# Patient Record
Sex: MALE | Race: WHITE
[De-identification: names, ages, dates, MRNs, and addresses within clinical notes are randomized per-mention and may not be internally consistent; named-entity substitution may affect disease eponyms.]

---

## 2017-04-27 ENCOUNTER — HOSPITAL ENCOUNTER (OUTPATIENT)
Age: 47
Discharge: HOME | End: 2017-04-27
Payer: MEDICAID

## 2017-04-27 DIAGNOSIS — J45.909: Primary | ICD-10-CM

## 2017-07-10 ENCOUNTER — HOSPITAL ENCOUNTER (EMERGENCY)
Dept: HOSPITAL 76 - ED | Age: 47
Discharge: HOME | End: 2017-07-10
Payer: MEDICAID

## 2017-07-10 VITALS — SYSTOLIC BLOOD PRESSURE: 125 MMHG | DIASTOLIC BLOOD PRESSURE: 87 MMHG

## 2017-07-10 DIAGNOSIS — R03.0: ICD-10-CM

## 2017-07-10 DIAGNOSIS — K08.89: Primary | ICD-10-CM

## 2017-07-10 PROCEDURE — 99283 EMERGENCY DEPT VISIT LOW MDM: CPT

## 2017-07-10 NOTE — ED PHYSICIAN DOCUMENTATION
PD HPI HEENT





- Stated complaint


Stated Complaint: TOOTH PX





- Chief complaint


Chief Complaint: Heent





- History obtained from


History obtained from: Patient, Family





- History of Present Illness


Timing - onset: How many days ago (4)


Timing - duration: Days (4)


Timing - details: Gradual onset


Pain level max: 7


Pain level now: 6


Location: Tooth (L lower molar)


Improves: Nothing


Worsens: Temperatures, Other (chewing).  No: Swalllowing, Noise, Position


Associated symptoms: No: Fever, Congestion, Rhinorrhea, Trismus, Unable to 

swallow, Swollen nodes, Facial swelling, Headache, Cough


Similar symptoms before: Has not had sx before


Recently seen: Not recently seen





Review of Systems


Ten Systems: 10 systems reviewed and negative


Constitutional: denies: Fever, Chills


Ears: denies: Ear pain


Nose: denies: Rhinorrhea / runny nose, Congestion


Throat: denies: Sore throat


Cardiac: denies: Chest pain / pressure, Calf pain


Respiratory: denies: Cough


GI: denies: Nausea, Vomiting


Skin: denies: Rash


Musculoskeletal: denies: Neck pain, Back pain


Neurologic: denies: Headache





PD PAST MEDICAL HISTORY





- Past Medical History


Cardiovascular: None


Respiratory: Asthma


Neuro: None


Endocrine/Autoimmune: None


GI: None


: None


HEENT: None


Psych: None


Musculoskeletal: None


Derm: None





- Past Surgical History


Past Surgical History: No





- Present Medications


Home Medications: 


 Ambulatory Orders











 Medication  Instructions  Recorded  Confirmed


 


Albuterol [Ventolin Hfa] 2 puffs IH Q4HR PRN 11/28/15 07/10/17


 


Hydrocodone/Acetaminophen 1 - 2 each PO Q6H PRN #14 tablet 07/10/17 





[Hydrocodon-Acetaminophen 5-325]   


 


Penicillin V Potassium 500 mg PO Q6HR #40 tablet 07/10/17 














- Allergies


Allergies/Adverse Reactions: 


 Allergies











Allergy/AdvReac Type Severity Reaction Status Date / Time


 


No Known Drug Allergies Allergy   Verified 07/10/17 19:24














- Social History


Does the pt smoke?: No


Smoking Status: Never smoker


Does the pt drink ETOH?: Yes


Does the pt have substance abuse?: No





- Immunizations


Immunizations are current?: Yes





PD ED PE NORMAL





- Vitals


Vital signs reviewed: Yes





- General


General: Alert and oriented X 3, No acute distress





- HEENT


HEENT: Ears normal, Moist mucous membranes





- Neck


Neck: Supple, no meningeal sign





- Cardiac


Cardiac: RRR, Strong equal pulses





- Respiratory


Respiratory: No respiratory distress, Clear bilaterally





- Derm


Derm: Warm and dry





- Neuro


Neuro: Alert and oriented X 3





- Psych


Psych: Normal mood, Normal affect





PD ED PE EXPANDED





- HEENT


HEENT Visual: 


  __________________________














  __________________________





 1 - tenderness (no swelling, no abscess.)








Results





- Vitals


Vitals: 


 Vital Signs - 24 hr











  07/10/17





  19:22


 


Temperature 36.4 C L


 


Heart Rate 64


 


Respiratory 18





Rate 


 


Blood Pressure 125/87 H


 


O2 Saturation 97








 Oxygen











O2 Source                      Room air

















PD MEDICAL DECISION MAKING





- ED course


Complexity details: considered differential, d/w patient


ED course: 





Patient is a 47-year-old male who presents to the emergency department with 

dental pain.  Tenderness to palpation over the tooth.  He is well-appearing, 

nontoxic.  Afebrile.  No drainable abscess.  No facial cellulitis. Patient 

counseled regarding signs and symptoms for which I believe and urgent re-

evaluation would be necessary. Patient with good understanding of and agreement 

to plan and is comfortable going home at this time





This document was made in part using voice recognition software. While efforts 

are made to proofread this document, sound alike and grammatical errors may 

occur.





Departure





- Departure


Disposition: 01 Home, Self Care


Clinical Impression: 


 Pain, dental


Condition: Good


Instructions:  ED Tooth Pain


Follow-Up: 


your,dentist tomorrow [Other]


Prescriptions: 


Penicillin V Potassium 500 mg PO Q6HR #40 tablet


Hydrocodone/Acetaminophen [Hydrocodon-Acetaminophen 5-325] 1 - 2 each PO Q6H 

PRN #14 tablet


 PRN Reason: pain


Comments: 


Take all antibiotics until gone.  Return if you worsen.  You need to follow-up 

with a dentist tomorrow for evaluation of your tooth.


Do not drink alcohol or drive while on narcotic pain medicine. 


Note that many narcotic pain relievers also contain tylenol/acetaminophen. 

Please ensure that your total dose of acetaminophen from all sources does not 

exceed 3 grams (3000mg) per day. 


You may constipated on this medication, take a stool softener such as "Colace" 

twice a day while you are on it. Also recommend a over-the-counter laxative 

such as senna or MiraLAX any day that you do not have a bowel movement. 


If you received narcotic pain medication in the emergency department, do not 

drive or operate machinery for the next 24 hours.








Your blood pressure was elevated today on check in to the emergency department. 

This does not mean that you have hypertension, it is a common phenomenon to 

check into the emergency department and have elevated blood pressure. I 

recommend that you see your primary care physician within the week to have it 

rechecked when you're feeling better.





Discharge Date/Time: 07/10/17 19:56

## 2018-11-15 ENCOUNTER — HOSPITAL ENCOUNTER (OUTPATIENT)
Age: 48
End: 2018-11-15
Payer: COMMERCIAL

## 2018-11-15 DIAGNOSIS — M51.17: ICD-10-CM

## 2018-11-15 DIAGNOSIS — M48.061: ICD-10-CM

## 2018-11-15 DIAGNOSIS — M48.07: ICD-10-CM

## 2018-11-15 DIAGNOSIS — M51.16: Primary | ICD-10-CM

## 2018-11-15 DIAGNOSIS — M47.26: ICD-10-CM

## 2018-11-15 DIAGNOSIS — M47.27: ICD-10-CM

## 2018-11-15 PROCEDURE — 72148 MRI LUMBAR SPINE W/O DYE: CPT

## 2018-11-15 NOTE — DI.MRI.S_ITS
PROCEDURE:  MR LUMBAR SPINE WO CON  
   
INDICATIONS:  Lumbago with sciatica, left side  
   
TECHNIQUE:    
Noncontrast sagittal T1 spin echo and T2 fast echo, sagittal STIR, axial T1 and T2 fast   
spin echo through the lumbar spine.  In cases with scoliosis, additional coronal T2 fast   
spin echo may be performed.    
   
COMPARISON:  None.  
   
FINDINGS:    
Image quality:  Excellent.    
   
Alignment and Curvature:  There is normal bony alignment.    
   
Bone Marrow: Mild reactive endplate change is noted adjacent to the L1-L2 and L2-L3   
discs. No acute vertebral body compression fractures.    
   
Spinal Cord:  Conus medullaris terminates at the T12-L1 disc level.  Visualized cord   
demonstrates normal signal and size.    
   
Paraspinous Soft Tissues:  No paravertebral masses.    
   
L1-L2: Loss of disc signal. Mild, diffuse disc bulge. No central stenosis. No neural   
foraminal narrowing. No neural impingement.  
   
L2-L3: Loss of disc signal. No central stenosis. No neural foraminal narrowing. No neural   
impingement.  
   
L3-L4:  Loss of disc signal. No central stenosis. No neural foraminal narrowing. No   
neural impingement.  
   
L4-L5:  Loss of disc signal. Mild bilateral facet hypertrophy. No central stenosis. No   
neural foraminal narrowing. No neural impingement.  
   
L5-S1: Loss of disc signal and height. Mild, diffuse disc bulge. Mild bilateral facet   
hypertrophy. Moderate-sized left central disc extrusion. Extruded disc material impinges   
upon the traversing left S1 nerve root. Mild narrowing of the central canal. No neural   
foraminal narrowing.  
   
IMPRESSION:  
   
1. Multilevel degenerative disc disease.  
   
2. Multilevel facet arthropathy.  
   
3. Moderate-sized left central L5-S1 disc extrusion which impinges upon the traversing   
left S1 nerve root. Please correlate with clinical data.  
   
4. Mild L5-S1 central canal narrowing.  
   
5. No neural foraminal narrowing.  
   
   
   
Dictated by: Lucero Valdez MD, PhD on 11/15/2018 at 9:44       
Approved by: Lucero Valdez MD, PhD on 11/15/2018 at 9:47

## 2020-06-15 ENCOUNTER — HOSPITAL ENCOUNTER (EMERGENCY)
Age: 50
LOS: 1 days | Discharge: HOME | End: 2020-06-16
Payer: COMMERCIAL

## 2020-06-15 VITALS
RESPIRATION RATE: 18 BRPM | TEMPERATURE: 98.24 F | DIASTOLIC BLOOD PRESSURE: 64 MMHG | SYSTOLIC BLOOD PRESSURE: 129 MMHG | OXYGEN SATURATION: 98 % | HEART RATE: 79 BPM

## 2020-06-15 DIAGNOSIS — U07.1: Primary | ICD-10-CM

## 2020-06-15 LAB
ADD MANUAL DIFF / SLIDE REVIEW: NO
ALBUMIN SERPL-MCNC: 4.1 G/DL (ref 3.5–5)
ALBUMIN/GLOB SERPL: 1.1 {RATIO} (ref 1–2.8)
ALP SERPL-CCNC: 66 U/L (ref 38–126)
ALT SERPL-CCNC: 34 IU/L (ref ?–50)
BUN SERPL-MCNC: 11 MG/DL (ref 9–20)
CALCIUM SERPL-MCNC: 8.4 MG/DL (ref 8.4–10.2)
CHLORIDE SERPL-SCNC: 97 MMOL/L (ref 98–107)
CK SERPL-CCNC: 92 U/L (ref 55–170)
CKMB % RELATIVE INDEX: (no result) % (ref 1.5–5)
CO2 SERPL-SCNC: 28 MMOL/L (ref 22–32)
ESTIMATED GLOMERULAR FILT RATE: > 60 ML/MIN (ref 60–?)
GLOBULIN SER CALC-MCNC: 3.6 G/DL (ref 1.7–4.1)
GLUCOSE SERPL-MCNC: 104 MG/DL (ref 70–100)
HEMATOCRIT: 48.5 % (ref 41–53)
HEMOGLOBIN: 16.6 G/DL (ref 13.5–17.5)
HEMOLYSIS: < 15 (ref 0–50)
LACTATE SERPL-MCNC: 0.8 MMOL/L (ref 0.7–2.1)
LYMPHOCYTES # SPEC AUTO: 1500 /UL (ref 1100–4500)
MCV RBC: 89.1 FL (ref 80–100)
MEAN CORPUSCULAR HEMOGLOBIN: 30.5 PG (ref 26–34)
MEAN CORPUSCULAR HGB CONC: 34.3 % (ref 30–36)
PLATELET COUNT: 160 X10^3/UL (ref 150–400)
POTASSIUM SERPL-SCNC: 4.5 MMOL/L (ref 3.4–5.1)
PROT SERPL-MCNC: 7.7 G/DL (ref 6.3–8.2)
SARS-COV-2 RDRP RESP QL NAA+PROBE: POSITIVE
SODIUM SERPL-SCNC: 134 MMOL/L (ref 137–145)
TROPONIN I SERPL-MCNC: < 0.012 NG/ML (ref 0.01–0.03)

## 2020-06-15 PROCEDURE — 87635 SARS-COV-2 COVID-19 AMP PRB: CPT

## 2020-06-15 PROCEDURE — 71045 X-RAY EXAM CHEST 1 VIEW: CPT

## 2020-06-15 PROCEDURE — 82550 ASSAY OF CK (CPK): CPT

## 2020-06-15 PROCEDURE — 83605 ASSAY OF LACTIC ACID: CPT

## 2020-06-15 PROCEDURE — 84484 ASSAY OF TROPONIN QUANT: CPT

## 2020-06-15 PROCEDURE — 99281 EMR DPT VST MAYX REQ PHY/QHP: CPT

## 2020-06-15 PROCEDURE — 80053 COMPREHEN METABOLIC PANEL: CPT

## 2020-06-15 PROCEDURE — 85025 COMPLETE CBC W/AUTO DIFF WBC: CPT

## 2020-06-15 PROCEDURE — 99284 EMERGENCY DEPT VISIT MOD MDM: CPT

## 2020-06-15 PROCEDURE — 36415 COLL VENOUS BLD VENIPUNCTURE: CPT

## 2020-06-15 NOTE — ED.FEVER
"HPI - Fever
General
Chief Complaint: Fever
Stated Complaint: states high temperature,cough,tired
Time Seen by Provider: 06/15/20 23:24
Source: patient
Mode of arrival: Ambulatory
Limitations: no limitations
History of Present Illness
HPI Narrative: The patient has been ill for 6 days.  He is experience headache, sore throat and cough.  He is not having much cough now.  He does have mild GI upset, without nausea vomiting.  He had fever to 101 earlier in the week, none now.  He is 
eating and drinking well.  He and his wife drove to [facility]  Last week.  I saw his wife in the ER last night, she is COVID-19 positive.  They made the dry without stops.  The visit with relatives who not ill.  He May 2 trips to the store.  He and 
his wife were unaware of any obvious contacts.  He is nonsmoker.  He denies chronic medical problems.  He is having no difficulty breathing at this time.  He works locally, he has unaware of any COVID-19 exposure at work.
Related Data
Home Medications

 Medication  Instructions  Recorded  Confirmed
ibuprofen 200 mg PO TID PRN #0 02/22/17 
albuterol sulfate [Ventolin HFA] 2 puff INH #0 04/18/17 
fluticasone propionate [Flovent #0 04/18/17 
HFA]   


Allergies

Allergy/AdvReac Type Severity Reaction Status Date / Time
No Known Allergies Allergy   Uncoded 04/11/18 11:57



Review of Systems
Constitutional
Constitutional: Reports system reviewed and no additional complaints, except as documented and Reports headache(s)
Eyes
Eyes: Denies blurry vision and Denies change in vision
ENT
Ears, Nose, Mouth, and Throat: Denies dizziness, Reports headache(s), Denies mouth pain, Denies nasal discharge and Denies neck pain
Comments: He complains of sore throat that has improved.
Cardiovascular
Cardiovascular: Denies chest pain, Denies irregular heart rhythm, Denies lightheadedness and Denies dyspnea
Respiratory
Respiratory: Reports cough and Denies dyspnea
Gastrointestinal
Gastrointestinal: Reports abdominal pain, Denies constipation, Denies diarrhea, Denies nausea and Denies vomiting
Genitourinary
Comments: No complaints.
Musculoskeletal
Musculoskeletal: Denies back pain, Denies neck pain and Denies numbness
Integumentary/Breasts
Skin/Breast: Denies erythema, Denies rash and Denies wounds
Neurologic
Neurologic: Denies behavioral changes, Denies confusion, Denies dizziness, Reports headache(s) and Denies numbness
Psychiatric
Psychiatric: Denies behavioral changes and Denies confusion

Patient History
Social History (Reviewed 06/15/20 @ 23:48 by Brayan Ashraf MD)
Smoking Status:  Never smoker 


Smoking Status: Never smoker
alcohol intake frequency: holidays/special occasions only

Exam
Initial Vital Signs
Initial Vital Signs: Vital Signs

Temperature  98.3 F   06/15/20 21:02
Pulse Rate  79   06/15/20 21:02
Respiratory Rate  18   06/15/20 21:02
Blood Pressure  129/64   06/15/20 21:02
Pulse Oximetry  98   06/15/20 21:02


Const
General: cooperative and well developed
Nutritional Appearance: well nourished
HENMT
Nose: external nose normal
Face and sinus: normal facial exam
Mouth: oral mucosae normal
Throat: posterior oropharynx normal
Eyes
Conjunctivae: conjunctivae normal
Pupils: PERRL
EOM: EOM intact bilaterally
Neck
Neck: supple and No tender
Chest
Chest: normal inspection of the chest
Resp
Effort & Inspection: normal respiratory effort, able to speak in complete sentences, no respiratory distress and no use of accessory muscles
Auscultation: clear to auscultation bilaterally, no rales, no rhonchi and no wheezes
Cardio
Rate: regular rate
Rhythm: regular rhythm
Heart Sounds: S1 normal, S2 normal, no click, no gallops, no murmurs and no rubs
Pulses: normal peripheral pulses
GI
Inspection: non-distended
Palpation: soft, no hepatosplenomegaly, No guarding, No pulsatile mass and No tender
Auscultation: normal bowel sounds
Back/Spine/Pelvis
Back: No CVA tenderness
Cervical Spine: cervical ROM normal
Thoracic/Lumbar Spin
[id number]|2020-06-15 23:24:36|2020-06-15 23:24:36|ED.FEVER||||"HPI - Fever

## 2020-06-15 NOTE — ED_ITS
"HPI - Fever    
General    
Chief Complaint: Fever    
Stated Complaint: states high temperature,cough,tired    
Time Seen by Provider: 06/15/20 23:24    
Source: patient    
Mode of arrival: Ambulatory    
Limitations: no limitations    
History of Present Illness    
HPI Narrative: The patient has been ill for 6 days.  He is experience headache,   
sore throat and cough.  He is not having much cough now.  He does have mild GI   
upset, without nausea vomiting.  He had fever to 101 earlier in the week, none   
now.  He is eating and drinking well.  He and his wife drove to [facility]  Last   
week.  I saw his wife in the ER last night, she is COVID-19 positive.  They made  
the dry without stops.  The visit with relatives who not ill.  He May 2 trips to  
the store.  He and his wife were unaware of any obvious contacts.  He is   
nonsmoker.  He denies chronic medical problems.  He is having no difficulty   
breathing at this time.  He works locally, he has unaware of any COVID-19   
exposure at work.    
Related Data    
                                Home Medications    
    
    
    
 Medication  Instructions  Recorded  Confirmed    
     
ibuprofen 200 mg PO TID PRN #0 02/22/17     
     
albuterol sulfate [Ventolin HFA] 2 puff INH #0 04/18/17     
     
fluticasone propionate [Flovent #0 04/18/17     
    
HFA]       
    
    
    
                                    Allergies    
    
    
    
Allergy/AdvReac Type Severity Reaction Status Date / Time    
     
No Known Allergies Allergy   Uncoded 04/11/18 11:57    
    
    
    
    
Review of Systems    
Constitutional    
Constitutional: Reports system reviewed and no additional complaints, except as   
documented and Reports headache(s)    
Eyes    
Eyes: Denies blurry vision and Denies change in vision    
ENT    
Ears, Nose, Mouth, and Throat: Denies dizziness, Reports headache(s), Denies   
mouth pain, Denies nasal discharge and Denies neck pain    
Comments: He complains of sore throat that has improved.    
Cardiovascular    
Cardiovascular: Denies chest pain, Denies irregular heart rhythm, Denies   
lightheadedness and Denies dyspnea    
Respiratory    
Respiratory: Reports cough and Denies dyspnea    
Gastrointestinal    
Gastrointestinal: Reports abdominal pain, Denies constipation, Denies diarrhea,   
Denies nausea and Denies vomiting    
Genitourinary    
Comments: No complaints.    
Musculoskeletal    
Musculoskeletal: Denies back pain, Denies neck pain and Denies numbness    
Integumentary/Breasts    
Skin/Breast: Denies erythema, Denies rash and Denies wounds    
Neurologic    
Neurologic: Denies behavioral changes, Denies confusion, Denies dizziness,   
Reports headache(s) and Denies numbness    
Psychiatric    
Psychiatric: Denies behavioral changes and Denies confusion    
    
Patient History    
Social History (Reviewed 06/15/20 @ 23:48 by Brayan Ashraf MD)    
Smoking Status:  Never smoker     
    
    
Smoking Status: Never smoker    
alcohol intake frequency: holidays/special occasions only    
    
Exam    
Initial Vital Signs    
Initial Vital Signs: Vital Signs    
    
    
    
Temperature  98.3 F   06/15/20 21:02    
     
Pulse Rate  79   06/15/20 21:02    
     
Respiratory Rate  18   06/15/20 21:02    
     
Blood Pressure  129/64   06/15/20 21:02    
     
Pulse Oximetry  98   06/15/20 21:02    
    
    
    
Const    
General: cooperative and well developed    
Nutritional Appearance: well nourished    
HENMT    
Nose: external nose normal    
Face and sinus: normal facial exam    
Mouth: oral mucosae normal    
Throat: posterior oropharynx normal    
Eyes    
Conjunctivae: conjunctivae normal    
Pupils: PERRL    
EOM: EOM intact bilaterally    
Neck    
Neck: supple and No tender    
Chest    
Chest: normal inspection of the chest    
Resp    
Effort & Inspection: normal respiratory effort, able to speak i
[id number]|2020-06-15 22:11:00|2020-06-16 08:45:00|DI.RAD.S_ITS|[facility]|Imaging|[id number]|"PROCEDURE:  XR CHEST 1V

## 2020-06-16 VITALS
OXYGEN SATURATION: 96 % | RESPIRATION RATE: 20 BRPM | HEART RATE: 83 BPM | SYSTOLIC BLOOD PRESSURE: 126 MMHG | DIASTOLIC BLOOD PRESSURE: 75 MMHG

## 2020-06-20 ENCOUNTER — HOSPITAL ENCOUNTER (EMERGENCY)
Age: 50
Discharge: HOME | End: 2020-06-20
Payer: COMMERCIAL

## 2020-06-20 VITALS
HEART RATE: 72 BPM | OXYGEN SATURATION: 96 % | RESPIRATION RATE: 20 BRPM | TEMPERATURE: 98.24 F | SYSTOLIC BLOOD PRESSURE: 116 MMHG | DIASTOLIC BLOOD PRESSURE: 76 MMHG

## 2020-06-20 VITALS
OXYGEN SATURATION: 100 % | SYSTOLIC BLOOD PRESSURE: 108 MMHG | RESPIRATION RATE: 16 BRPM | HEART RATE: 61 BPM | DIASTOLIC BLOOD PRESSURE: 75 MMHG

## 2020-06-20 VITALS — BODY MASS INDEX: 31.8 KG/M2

## 2020-06-20 VITALS
DIASTOLIC BLOOD PRESSURE: 74 MMHG | RESPIRATION RATE: 24 BRPM | SYSTOLIC BLOOD PRESSURE: 111 MMHG | TEMPERATURE: 98.7 F | OXYGEN SATURATION: 96 % | HEART RATE: 65 BPM

## 2020-06-20 VITALS — TEMPERATURE: 98.3 F

## 2020-06-20 DIAGNOSIS — U07.1: Primary | ICD-10-CM

## 2020-06-20 DIAGNOSIS — J45.21: ICD-10-CM

## 2020-06-20 LAB
ADD MANUAL DIFF / SLIDE REVIEW: NO
ALBUMIN SERPL-MCNC: 3.9 G/DL (ref 3.5–5)
ALBUMIN/GLOB SERPL: 1.1 {RATIO} (ref 1–2.8)
ALP SERPL-CCNC: 79 U/L (ref 38–126)
ALT SERPL-CCNC: 28 IU/L (ref ?–50)
BUN SERPL-MCNC: 9 MG/DL (ref 9–20)
CALCIUM SERPL-MCNC: 8.6 MG/DL (ref 8.4–10.2)
CHLORIDE SERPL-SCNC: 97 MMOL/L (ref 98–107)
CK SERPL-CCNC: 71 U/L (ref 55–170)
CKMB % RELATIVE INDEX: (no result) % (ref 1.5–5)
CO2 SERPL-SCNC: 30 MMOL/L (ref 22–32)
ESTIMATED GLOMERULAR FILT RATE: > 60 ML/MIN (ref 60–?)
GLOBULIN SER CALC-MCNC: 3.7 G/DL (ref 1.7–4.1)
GLUCOSE SERPL-MCNC: 107 MG/DL (ref 70–100)
HEMATOCRIT: 47.3 % (ref 41–53)
HEMOGLOBIN: 16.6 G/DL (ref 13.5–17.5)
HEMOLYSIS: < 15 (ref 0–50)
LYMPHOCYTES # SPEC AUTO: 1300 /UL (ref 1100–4500)
MCV RBC: 88 FL (ref 80–100)
MEAN CORPUSCULAR HEMOGLOBIN: 30.8 PG (ref 26–34)
MEAN CORPUSCULAR HGB CONC: 35 % (ref 30–36)
PLATELET COUNT: 149 X10^3/UL (ref 150–400)
POTASSIUM SERPL-SCNC: 4.3 MMOL/L (ref 3.4–5.1)
PROT SERPL-MCNC: 7.6 G/DL (ref 6.3–8.2)
SODIUM SERPL-SCNC: 139 MMOL/L (ref 137–145)
TROPONIN I SERPL-MCNC: < 0.012 NG/ML (ref 0.01–0.03)

## 2020-06-20 PROCEDURE — 99284 EMERGENCY DEPT VISIT MOD MDM: CPT

## 2020-06-20 PROCEDURE — 93005 ELECTROCARDIOGRAM TRACING: CPT

## 2020-06-20 PROCEDURE — 82550 ASSAY OF CK (CPK): CPT

## 2020-06-20 PROCEDURE — 80053 COMPREHEN METABOLIC PANEL: CPT

## 2020-06-20 PROCEDURE — 84484 ASSAY OF TROPONIN QUANT: CPT

## 2020-06-20 PROCEDURE — 71045 X-RAY EXAM CHEST 1 VIEW: CPT

## 2020-06-20 PROCEDURE — 36415 COLL VENOUS BLD VENIPUNCTURE: CPT

## 2020-06-20 PROCEDURE — 85025 COMPLETE CBC W/AUTO DIFF WBC: CPT

## 2020-06-20 NOTE — ED_ITS
"HPI - SOB/Dyspnea    
<LESLEY Forbes - Last Filed: 06/20/20 20:45>    
General    
Chief Complaint: Shortness of Breath/Dyspnea    
Stated Complaint: COVID Positive, SOB, really tired, back hurts    
Time Seen by Provider: 06/20/20 16:56    
Source: patient    
Mode of arrival: Ambulatory    
Limitations: no limitations    
History of Present Illness    
HPI Narrative: 50-year-old male who recently tested positive for COVID-19 on   
06/15/20, presents emergency department for worsening shortness of breath.    
Patient states shortness of breath is worse at night any has increased cough,   
patient has been unable to sleep while for the past 3 days due to symptoms.    
Patient denies any nausea, vomiting, abdominal pain, dizziness, wheezing,   
headaches, chest pain, or any other concerns.  Patient denies any significant   
allergies or major medical issues.  Patient states he has taken albuterol in the  
past for reactive airway disease.    
Related Data    
                                Home Medications    
    
    
    
 Medication  Instructions  Recorded  Confirmed    
     
ibuprofen 200 mg PO TID PRN #0 02/22/17     
     
albuterol sulfate [Ventolin HFA] 2 puff INH #0 04/18/17     
     
fluticasone propionate [Flovent #0 04/18/17     
    
HFA]       
    
    
                                  Previous Rx's    
    
    
    
 Medication  Instructions  Recorded    
     
dexamethasone 10 mg PO DAILY 4 Days #10 tab 06/20/20    
    
    
    
                                    Allergies    
    
    
    
Allergy/AdvReac Type Severity Reaction Status Date / Time    
     
No Known Drug Allergies Allergy   Verified 06/20/20 19:19    
    
    
    
    
Review of Systems    
<LESLEY Forbes - Last Filed: 06/20/20 20:45>    
Review of Systems    
Narrative: REVIEW OF SYSTEMS:    
    
GENERAL: Denies fevers.     
    
HENT: No head trauma or hearing loss.     
    
EYES: No vision changes.    
    
CARDIOVASCULAR: No chest pain.    
     
RESPIRATORY:  Reports shortness of breath and dry cough, see HPI.      
    
GASTROINTESTINAL: No nausea, vomiting, diarrhea, or constipation.     
    
MUSCULOSKELETAL: No weakness or injury.    
    
INTEGUMENTARY: No rash.     
    
NEURO: No memory loss, or confusion.    
    
    
Patient History    
<LESLEY Forbes - Last Filed: 06/20/20 20:45>    
Medical History (Reviewed 06/20/20 @ 18:17 by LESLEY Forbes)    
    
No pertinent past medical history (Acute)    
    
    
Social History (Reviewed 06/20/20 @ 18:17 by LESLEY Forbes)    
Smoking Status:  Never smoker     
    
    
Smoking Status: Never smoker    
alcohol intake frequency: holidays/special occasions only    
Substance Use Type: does not use    
    
Exam    
<Annika JohnsonLESLEY - Last Filed: 06/20/20 20:45>    
Initial Vital Signs    
Initial Vital Signs: Vital Signs    
    
    
    
Temperature  98.7 F   06/20/20 17:01    
     
Pulse Rate  65   06/20/20 17:01    
     
Respiratory Rate  24   06/20/20 17:01    
     
Blood Pressure  111/74   06/20/20 17:01    
     
Pulse Oximetry  96   06/20/20 17:01    
    
    
PHYSICAL EXAMINATION:     
GENERAL: Well groomed, alert, and cooperative. Answers questions promptly and   
appropriately. Vital signs noted.     
    
HENT: Normocephalic, atraumatic. Ear canals patent. Oropharynx without erythema.  
      
    
EYES: Conjunctiva pink, sclera white, no periorbital swelling.  No discharge.    
    
CHEST: Normal to inspection and without deformities.    
    
CARDIOVASCULAR: S1 and S2 sounds normal. Regular rate and rhythm, no murmurs,   
clicks, or bruits.     
    
RESPIRATORY: Normal respiratory rate, trachea midline, airway patent. No   
stridor, nasal flaring or accessory muscle use.  Able to speak in full sentences  
. Lungs are clear in all fields without wheeze, rhonchi, or crackles.    
Occasiona
[id number]|2020-06-20 17:20:00|2020-06-20 18:24:00|DI.RACHELLE.S_ITS|TIM|Imaging|[id number]|"PROCEDURE:  XR CHEST 1V

## 2020-07-02 ENCOUNTER — HOSPITAL ENCOUNTER (OUTPATIENT)
Age: 50
End: 2020-07-02
Payer: COMMERCIAL

## 2020-07-02 DIAGNOSIS — U07.1: Primary | ICD-10-CM

## 2020-07-02 PROCEDURE — 87635 SARS-COV-2 COVID-19 AMP PRB: CPT

## 2022-02-05 ENCOUNTER — HOSPITAL ENCOUNTER (OUTPATIENT)
Dept: HOSPITAL 76 - LAB.S | Age: 52
Discharge: HOME | End: 2022-02-05
Attending: NURSE PRACTITIONER
Payer: MEDICAID

## 2022-02-05 DIAGNOSIS — E78.5: Primary | ICD-10-CM

## 2022-02-05 DIAGNOSIS — Z13.228: ICD-10-CM

## 2022-02-05 DIAGNOSIS — Z12.5: ICD-10-CM

## 2022-02-05 DIAGNOSIS — Z13.0: ICD-10-CM

## 2022-02-05 DIAGNOSIS — Z13.29: ICD-10-CM

## 2022-02-05 LAB
ALBUMIN DIAFP-MCNC: 4.2 G/DL (ref 3.2–5.5)
ALBUMIN/GLOB SERPL: 1.1 {RATIO} (ref 1–2.2)
ALP SERPL-CCNC: 63 IU/L (ref 42–121)
ALT SERPL W P-5'-P-CCNC: 27 IU/L (ref 10–60)
ANION GAP SERPL CALCULATED.4IONS-SCNC: 10 MMOL/L (ref 6–13)
AST SERPL W P-5'-P-CCNC: 30 IU/L (ref 10–42)
BILIRUB BLD-MCNC: 1 MG/DL (ref 0.2–1)
BUN SERPL-MCNC: 16 MG/DL (ref 6–20)
CALCIUM UR-MCNC: 9.2 MG/DL (ref 8.5–10.3)
CHLORIDE SERPL-SCNC: 105 MMOL/L (ref 101–111)
CHOLEST SERPL-MCNC: 258 MG/DL
CO2 SERPL-SCNC: 22 MMOL/L (ref 21–32)
CREAT SERPLBLD-SCNC: 0.7 MG/DL (ref 0.6–1.2)
GFRSERPLBLD MDRD-ARVRAT: 119 ML/MIN/{1.73_M2} (ref 89–?)
GLOBULIN SER-MCNC: 3.9 G/DL (ref 2.1–4.2)
GLUCOSE SERPL-MCNC: 125 MG/DL (ref 70–100)
HDLC SERPL-MCNC: 55 MG/DL
HDLC SERPL: 4.7 {RATIO} (ref ?–5)
LDLC SERPL CALC-MCNC: 193 MG/DL
LDLC/HDLC SERPL: 3.5 {RATIO} (ref ?–3.6)
POTASSIUM SERPL-SCNC: 4.3 MMOL/L (ref 3.5–5)
PROT SPEC-MCNC: 8.1 G/DL (ref 6.7–8.2)
SODIUM SERPLBLD-SCNC: 137 MMOL/L (ref 135–145)
TRIGL P FAST SERPL-MCNC: 49 MG/DL
TSH SERPL-ACNC: 0.42 UIU/ML (ref 0.34–5.6)
VLDLC SERPL-SCNC: 10 MG/DL

## 2022-02-05 PROCEDURE — 36415 COLL VENOUS BLD VENIPUNCTURE: CPT

## 2022-02-05 PROCEDURE — 85025 COMPLETE CBC W/AUTO DIFF WBC: CPT

## 2022-02-05 PROCEDURE — 84153 ASSAY OF PSA TOTAL: CPT

## 2022-02-05 PROCEDURE — 80061 LIPID PANEL: CPT

## 2022-02-05 PROCEDURE — 80053 COMPREHEN METABOLIC PANEL: CPT

## 2022-02-05 PROCEDURE — 83721 ASSAY OF BLOOD LIPOPROTEIN: CPT

## 2022-02-05 PROCEDURE — 84443 ASSAY THYROID STIM HORMONE: CPT

## 2022-02-07 LAB
BASOPHILS NFR BLD AUTO: 0 10^3/UL (ref 0–0.1)
BASOPHILS NFR BLD AUTO: 0.3 %
EOSINOPHIL # BLD AUTO: 0 10^3/UL (ref 0–0.7)
EOSINOPHIL NFR BLD AUTO: 0.3 %
ERYTHROCYTE [DISTWIDTH] IN BLOOD BY AUTOMATED COUNT: 13.6 % (ref 12–15)
HCT VFR BLD AUTO: 46.6 % (ref 42–52)
HGB UR QL STRIP: 15.4 G/DL (ref 14–18)
LYMPHOCYTES # SPEC AUTO: 3.1 10^3/UL (ref 1.5–3.5)
LYMPHOCYTES NFR BLD AUTO: 31.5 %
MCH RBC QN AUTO: 29.6 PG (ref 27–31)
MCHC RBC AUTO-ENTMCNC: 33 G/DL (ref 32–36)
MCV RBC AUTO: 89.4 FL (ref 80–94)
MONOCYTES # BLD AUTO: 1 10^3/UL (ref 0–1)
MONOCYTES NFR BLD AUTO: 9.8 %
NEUTROPHILS # BLD AUTO: 5.7 10^3/UL (ref 1.5–6.6)
NEUTROPHILS # SNV AUTO: 9.8 X10^3/UL (ref 4.8–10.8)
NEUTROPHILS NFR BLD AUTO: 57.6 %
NRBC # BLD AUTO: 0 /100WBC
NRBC # BLD AUTO: 0 X10^3/UL
PDW BLD AUTO: 9.6 FL (ref 7.4–11.4)
PLATELET # BLD: 219 10^3/UL (ref 130–450)
RBC MAR: 5.21 10^6/UL (ref 4.7–6.1)

## 2022-05-17 ENCOUNTER — HOSPITAL ENCOUNTER (OUTPATIENT)
Dept: HOSPITAL 76 - LAB.N | Age: 52
Discharge: HOME | End: 2022-05-17
Attending: NURSE PRACTITIONER
Payer: MEDICAID

## 2022-05-17 DIAGNOSIS — R73.9: Primary | ICD-10-CM

## 2022-05-17 PROCEDURE — 36415 COLL VENOUS BLD VENIPUNCTURE: CPT

## 2022-05-17 PROCEDURE — 82947 ASSAY GLUCOSE BLOOD QUANT: CPT

## 2023-01-20 ENCOUNTER — HOSPITAL ENCOUNTER (EMERGENCY)
Age: 53
Discharge: HOME | End: 2023-01-20
Payer: COMMERCIAL

## 2023-01-20 VITALS
RESPIRATION RATE: 15 BRPM | SYSTOLIC BLOOD PRESSURE: 144 MMHG | HEART RATE: 57 BPM | DIASTOLIC BLOOD PRESSURE: 92 MMHG | TEMPERATURE: 97.7 F | OXYGEN SATURATION: 98 %

## 2023-01-20 VITALS
OXYGEN SATURATION: 100 % | SYSTOLIC BLOOD PRESSURE: 131 MMHG | HEART RATE: 58 BPM | RESPIRATION RATE: 16 BRPM | DIASTOLIC BLOOD PRESSURE: 78 MMHG

## 2023-01-20 VITALS — BODY MASS INDEX: 34.9 KG/M2

## 2023-01-20 VITALS — SYSTOLIC BLOOD PRESSURE: 131 MMHG | DIASTOLIC BLOOD PRESSURE: 78 MMHG

## 2023-01-20 DIAGNOSIS — K92.1: Primary | ICD-10-CM

## 2023-01-20 LAB
ADD MANUAL DIFF / SLIDE REVIEW: NO
BUN SERPL-MCNC: 13 MG/DL (ref 9–20)
CALCIUM SERPL-MCNC: 8.9 MG/DL (ref 8.4–10.2)
CHLORIDE SERPL-SCNC: 105 MMOL/L (ref 98–107)
CO2 SERPL-SCNC: 23 MMOL/L (ref 22–32)
ESTIMATED GLOMERULAR FILT RATE: > 60 ML/MIN (ref 60–?)
GLUCOSE SERPL-MCNC: 97 MG/DL (ref 70–100)
HEMATOCRIT: 46.2 % (ref 41–53)
HEMOGLOBIN: 15.4 G/DL (ref 13.5–17.5)
HEMOLYSIS: 25 (ref 0–50)
LYMPHOCYTES # SPEC AUTO: 2300 /UL (ref 1100–4500)
MCV RBC: 88.6 FL (ref 80–100)
MEAN CORPUSCULAR HEMOGLOBIN: 29.6 PG (ref 26–34)
MEAN CORPUSCULAR HGB CONC: 33.4 % (ref 30–36)
PLATELET COUNT: 205 X10^3/UL (ref 150–400)
POTASSIUM SERPL-SCNC: 3.7 MMOL/L (ref 3.4–5.1)
SODIUM SERPL-SCNC: 137 MMOL/L (ref 137–145)

## 2023-01-20 PROCEDURE — 99281 EMR DPT VST MAYX REQ PHY/QHP: CPT

## 2023-01-20 PROCEDURE — 80048 BASIC METABOLIC PNL TOTAL CA: CPT

## 2023-01-20 PROCEDURE — 99282 EMERGENCY DEPT VISIT SF MDM: CPT

## 2023-01-20 PROCEDURE — 85025 COMPLETE CBC W/AUTO DIFF WBC: CPT
